# Patient Record
Sex: FEMALE | URBAN - METROPOLITAN AREA
[De-identification: names, ages, dates, MRNs, and addresses within clinical notes are randomized per-mention and may not be internally consistent; named-entity substitution may affect disease eponyms.]

---

## 2022-01-07 ENCOUNTER — APPOINTMENT (RX ONLY)
Dept: URBAN - METROPOLITAN AREA CLINIC 152 | Facility: CLINIC | Age: 27
Setting detail: DERMATOLOGY
End: 2022-01-07

## 2022-01-07 DIAGNOSIS — L23.89 ALLERGIC CONTACT DERMATITIS DUE TO OTHER AGENTS: ICD-10-CM

## 2022-01-07 DIAGNOSIS — L21.8 OTHER SEBORRHEIC DERMATITIS: ICD-10-CM

## 2022-01-07 PROCEDURE — ? PRESCRIPTION

## 2022-01-07 PROCEDURE — ? DIAGNOSIS COMMENT

## 2022-01-07 PROCEDURE — ? COUNSELING

## 2022-01-07 PROCEDURE — ? PRESCRIPTION MEDICATION MANAGEMENT

## 2022-01-07 RX ORDER — TACROLIMUS 1 MG/G
OINTMENT TOPICAL
Qty: 30 | Refills: 5 | Status: ERX | COMMUNITY
Start: 2022-01-07

## 2022-01-07 RX ORDER — TRIAMCINOLONE ACETONIDE 1 MG/G
OINTMENT TOPICAL
Qty: 80 | Refills: 1 | Status: ERX | COMMUNITY
Start: 2022-01-07

## 2022-01-07 RX ORDER — KETOCONAZOLE 20 MG/G
CREAM TOPICAL BID
Qty: 30 | Refills: 7 | Status: ERX | COMMUNITY
Start: 2022-01-07

## 2022-01-07 RX ADMIN — KETOCONAZOLE: 20 CREAM TOPICAL at 00:00

## 2022-01-07 RX ADMIN — TACROLIMUS: 1 OINTMENT TOPICAL at 00:00

## 2022-01-07 RX ADMIN — TRIAMCINOLONE ACETONIDE: 1 OINTMENT TOPICAL at 00:00

## 2022-01-07 NOTE — HPI: OTHER
Condition:: Rash
Please Describe Your Condition:: Primarily on neck and face\\nDenies involvement on body\\nHad eczema as a child\\nSister has AD\\nNot currently treating and has never treated with prescription strength medication

## 2022-01-07 NOTE — PROCEDURE: COUNSELING
Detail Level: Detailed
Patient Specific Counseling (Will Not Stick From Patient To Patient): -Discussed etiology and most likely diagnosis \\n-Recommend treating with topical steroid\\n-I recommend patch testing, though I am concerned about insurance coverage and cost\\n-If patch testing cost prohibitive, recommend eliminating products and materials to determine cause\\n-Recommend Aquaphor\\n-Recommend CLn facial cleanser and CeraVe facial cleanser
Patient Specific Counseling (Will Not Stick From Patient To Patient): -Discussed etiology of seb derm\\n-Recommend treating with  ketoconazole 2% cream and tacrolimus 0.1% ointment

## 2022-01-07 NOTE — PROCEDURE: PRESCRIPTION MEDICATION MANAGEMENT
Initiate Treatment: TAC 0.1% ointment BID x 5 days, then QD x 5 day\\nTacrolimus 0.1% ointment BID x 10 days
Detail Level: Zone
Render In Strict Bullet Format?: No
Initiate Treatment: Ketoconazole 2% cream BID x 10 days\\nTacrolimus 0.1% ointment BID x 1 week

## 2022-03-18 ENCOUNTER — APPOINTMENT (RX ONLY)
Dept: URBAN - METROPOLITAN AREA CLINIC 152 | Facility: CLINIC | Age: 27
Setting detail: DERMATOLOGY
End: 2022-03-18

## 2022-03-18 DIAGNOSIS — L23.89 ALLERGIC CONTACT DERMATITIS DUE TO OTHER AGENTS: ICD-10-CM

## 2022-03-18 PROCEDURE — ? COUNSELING

## 2022-03-18 PROCEDURE — ? DIAGNOSIS COMMENT

## 2022-03-18 ASSESSMENT — LOCATION ZONE DERM: LOCATION ZONE: NECK

## 2022-03-18 ASSESSMENT — LOCATION SIMPLE DESCRIPTION DERM: LOCATION SIMPLE: RIGHT ANTERIOR NECK

## 2022-03-18 ASSESSMENT — LOCATION DETAILED DESCRIPTION DERM: LOCATION DETAILED: RIGHT INFERIOR ANTERIOR NECK

## 2022-03-18 NOTE — PROCEDURE: COUNSELING
Detail Level: Zone
Patient Specific Counseling (Will Not Stick From Patient To Patient): -Discussed greatly Improved, recommended treating with TAC 0.1% ointment once daily for ten more days and then tacrolimus ointment for 6 weeks, until rash is flat. \\n- Reviewed option to consider allergy testing in future if finances allow, provided referral sheet.\\n- Recommended sunscreen, provided handout with recommendations for darker skin types, provided samples of LRP tinted sunscreen. \\n- Discussed sun protection and time will help hyperpigmentation resolve.\\n- Recommended CeraVe facial cleanser and provided sample and coupons.